# Patient Record
Sex: MALE | Race: WHITE
[De-identification: names, ages, dates, MRNs, and addresses within clinical notes are randomized per-mention and may not be internally consistent; named-entity substitution may affect disease eponyms.]

---

## 2017-04-13 ENCOUNTER — HOSPITAL ENCOUNTER (EMERGENCY)
Dept: HOSPITAL 43 - DL.ED | Age: 37
Discharge: HOME | End: 2017-04-13
Payer: MEDICARE

## 2017-04-13 VITALS — SYSTOLIC BLOOD PRESSURE: 144 MMHG | DIASTOLIC BLOOD PRESSURE: 79 MMHG
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
## 2017-04-13 DIAGNOSIS — Z86.73: ICD-10-CM

## 2017-04-13 DIAGNOSIS — S06.0X0A: Primary | ICD-10-CM

## 2017-04-13 LAB
CHLORIDE SERPL-SCNC: 105 MMOL/L (ref 101–111)
SODIUM SERPL-SCNC: 137 MMOL/L (ref 135–145)

## 2017-04-13 PROCEDURE — 70450 CT HEAD/BRAIN W/O DYE: CPT

## 2017-04-13 PROCEDURE — 81001 URINALYSIS AUTO W/SCOPE: CPT

## 2017-04-13 PROCEDURE — 80305 DRUG TEST PRSMV DIR OPT OBS: CPT

## 2017-04-13 PROCEDURE — G0480 DRUG TEST DEF 1-7 CLASSES: HCPCS

## 2017-04-13 PROCEDURE — 36415 COLL VENOUS BLD VENIPUNCTURE: CPT

## 2017-04-13 PROCEDURE — 99283 EMERGENCY DEPT VISIT LOW MDM: CPT

## 2017-04-13 PROCEDURE — 85025 COMPLETE CBC W/AUTO DIFF WBC: CPT

## 2017-04-13 PROCEDURE — 80053 COMPREHEN METABOLIC PANEL: CPT

## 2017-04-13 NOTE — EDM.PDOC
ED HPI HEAD INJURY





- General


Chief Complaint: Head Injury


Stated Complaint: HIT ON HEAD, [id number]


Time Seen by Provider: 04/13/17 16:00


Source of Information: Reports: Family (mother), RN, RN notes reviewed


History Limitations: Reports: Physical impairment (DE/disabled)





- History of Present Illness


INITIAL COMMENTS - FREE TEXT/NARRATIVE: 


Presented to the ER from home by mother by POV with complaint of head injury. 

Mother states it was unwitnessed. It was apparent he had struck his head on a 

metal bar attached to his desk. She does not believe there was loss of 

consciousness, but is unsure. Patient indicated to her that he had struck his 

head on the metal bar. He was having pain. Denies any other injury. Patient is 

unable to provide any history.


Symptom Onset Date: 04/13/17


Place of Occurrence: home


Improves with: none


Worsens with: none


Associated Symptoms: Reports: no other symptoms





- Related Data


Allergies/ADRs: 


 Allergies











Allergy/AdvReac Type Severity Reaction Status Date / Time


 


No Known Allergies Allergy   Verified 04/13/17 16:05











Home Meds: 


 Home Meds





. [No Known Home Meds]  09/06/14 [History]











Past Medical History


Neurological History: Reports: Cerebral palsy, CVA


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


Psychiatric History: Reports: ADD


Other Psychiatric History: provasive personality?





Social & Family History





- Family History


Family Medical History: Noncontributory





- Tobacco Use


Smoking Status *Q: Never Smoker


Second Hand Smoke Exposure: No





- Caffeine Use


Caffeine Use: Reports: None





- Recreational Drug Use


Recreational Drug Use: No





- Living Situation & Occupation


Living situation: Reports: with family


Occupation: disabled





ED ROS GENERAL





- Review of Systems


Review Of Systems: Unable To Obtain





ED EXAM, HEAD INJURY





- Physical Exam


Exam: See Below


Exam Limited By: Physical impairment (nonverbal/DD.)


General Appearance: alert, WD/WN, no apparent distress


Head: normocephalic, scalp tenderness


Eyes: bilateral eye: normal inspection


Ears: normal external exam, normal canal, hearing grossly normal, normal TMs


Nose: normal inspection, normal mucousa, no blood


Throat/Mouth: Normal inspection, Normal lips, Normal gums, Normal oropharynx, 

No airway compromise


Neck: non-tender, full range of motion, normal alignment, normal inspection


Respiratory: no respiratory distress, lungs clear, normal breath sounds, no 

accessory muscle use, chest non-tender


Cardiovascular: regular rate, rhythm


Back Exam: normal inspection


Extremities: no evidence of injury, normal range of motion, non-tender, no 

pedal edema, pelvis stable


Neurologic: no motor/sensory deficits (Uable to completely assess neuro exam 

due to mental delay, however, no obvious deficits.)





Course





- Vital Signs


Last Recorded V/S: 


 Last Vital Signs











Temp  36.8 C   04/13/17 15:54


 


Pulse  84   04/13/17 15:54


 


Resp  18   04/13/17 15:54


 


BP  144/79 H  04/13/17 15:54


 


Pulse Ox  97   04/13/17 15:54














- Orders/Labs/Meds


Labs: 


 Laboratory Tests











  04/13/17 04/13/17 04/13/17 Range/Units





  16:37 16:37 17:10 


 


WBC  7.4    (5.0-10.0)  10^3/uL


 


RBC  4.79    (4.6-6.2)  10^6/uL


 


Hgb  14.9    (14.0-18.0)  g/dL


 


Hct  43.4    (40.0-54.0)  %


 


MCV  90.6    ()  fL


 


MCH  31.1    (27.0-34.0)  pg


 


MCHC  34.3    (33.0-35.0)  g/dL


 


Plt Count  183    (150-450)  10^3/uL


 


Neut % (Auto)  71.0    (42.2-75.2)  %


 


Lymph % (Auto)  20.3 L    (20.5-50.1)  %


 


Mono % (Auto)  7.7    (2-8)  %


 


Eos % (Auto)  0.5 L    (1.0-3.0)  %


 


Baso % (Auto)  0.5    (0.0-1.0)  %


 


Sodium   137   (135-145)  mmol/L


 


Potassium   3.8   (3.6-5.0)  mmol/L


 


Chloride   105   (101-111)  mmol/L


 


Carbon Dioxide   25.0   (21.0-31.0)  mmol/L


 


Anion Gap   10.8   


 


BUN   16   (7-18)  mg/dL


 


Creatinine   0.8   (0.6-1.3)  mg/dL


 


Est Cr Clr Drug Dosing   106.89   mL/min


 


Estimated GFR (MDRD)   > 60   


 


BUN/Creatinine Ratio   20.00   


 


Glucose   77   ()  mg/dL


 


Calcium   8.7   (8.4-10.2)  mg/dl


 


Total Bilirubin   0.7   (0.2-1.0)  mg/dL


 


AST   25   (10-42)  IU/L


 


ALT   33   (10-60)  IU/L


 


Alkaline Phosphatase   65   ()  IU/L


 


Total Protein   7.3   (6.7-8.2)  g/dl


 


Albumin   4.4   (3.2-5.5)  g/dl


 


Globulin   2.9   


 


Albumin/Globulin Ratio   1.52   


 


Urine Color    Yellow  (YELLOW)  


 


Urine Appearance    Slightly cloudy  (CLEAR)  


 


Urine pH    5.5  (5.0-9.0)  


 


Ur Specific Gravity    1.020  (1.005-1.030)  


 


Urine Protein    Negative  (NEGATIVE)  


 


Urine Glucose (UA)    Negative  (NEGATIVE)  


 


Urine Ketones    Negative  (NEGATIVE)  


 


Urine Occult Blood    Negative  (NEGATIVE)  


 


Urine Nitrite    Negative  (NEGATIVE)  


 


Urine Bilirubin    Negative  (NEGATIVE)  


 


Urine Urobilinogen    0.2  (0.2-1.0)  mg/dL


 


Ur Leukocyte Esterase    Negative  (NEGATIVE)  


 


Urine RBC    0-5  /HPF


 


Urine WBC    Not seen  (0-5/HPF)  /HPF


 


Amorphous Sediment    Rare  (0/HPF)  /HPF


 


Urine Mucus    Few H  /LPF


 


Urine Opiates Screen     (NEGATIVE)  


 


Ur Oxycodone Screen     (NEGATIVE)  


 


Urine Methadone Screen     (NEGATIVE)  


 


Ur Barbiturates Screen     (NEGATIVE)  


 


U Tricyclic Antidepress     (NEGATIVE)  


 


Ur Phencyclidine Scrn     (NEGATIVE)  


 


Ur Amphetamine Screen     (NEGATIVE)  


 


U Methamphetamines Scrn     (NEGATIVE)  


 


Urine MDMA Screen     (NEGATIVE)  


 


U Benzodiazepines Scrn     (NEGATIVE)  


 


Urine Cocaine Screen     (NEGATIVE)  


 


U Marijuana (THC) Screen     (NEGATIVE)  


 


Ethyl Alcohol   < 5   mg/dL














  04/13/17 Range/Units





  17:10 


 


WBC   (5.0-10.0)  10^3/uL


 


RBC   (4.6-6.2)  10^6/uL


 


Hgb   (14.0-18.0)  g/dL


 


Hct   (40.0-54.0)  %


 


MCV   ()  fL


 


MCH   (27.0-34.0)  pg


 


MCHC   (33.0-35.0)  g/dL


 


Plt Count   (150-450)  10^3/uL


 


Neut % (Auto)   (42.2-75.2)  %


 


Lymph % (Auto)   (20.5-50.1)  %


 


Mono % (Auto)   (2-8)  %


 


Eos % (Auto)   (1.0-3.0)  %


 


Baso % (Auto)   (0.0-1.0)  %


 


Sodium   (135-145)  mmol/L


 


Potassium   (3.6-5.0)  mmol/L


 


Chloride   (101-111)  mmol/L


 


Carbon Dioxide   (21.0-31.0)  mmol/L


 


Anion Gap   


 


BUN   (7-18)  mg/dL


 


Creatinine   (0.6-1.3)  mg/dL


 


Est Cr Clr Drug Dosing   mL/min


 


Estimated GFR (MDRD)   


 


BUN/Creatinine Ratio   


 


Glucose   ()  mg/dL


 


Calcium   (8.4-10.2)  mg/dl


 


Total Bilirubin   (0.2-1.0)  mg/dL


 


AST   (10-42)  IU/L


 


ALT   (10-60)  IU/L


 


Alkaline Phosphatase   ()  IU/L


 


Total Protein   (6.7-8.2)  g/dl


 


Albumin   (3.2-5.5)  g/dl


 


Globulin   


 


Albumin/Globulin Ratio   


 


Urine Color   (YELLOW)  


 


Urine Appearance   (CLEAR)  


 


Urine pH   (5.0-9.0)  


 


Ur Specific Gravity   (1.005-1.030)  


 


Urine Protein   (NEGATIVE)  


 


Urine Glucose (UA)   (NEGATIVE)  


 


Urine Ketones   (NEGATIVE)  


 


Urine Occult Blood   (NEGATIVE)  


 


Urine Nitrite   (NEGATIVE)  


 


Urine Bilirubin   (NEGATIVE)  


 


Urine Urobilinogen   (0.2-1.0)  mg/dL


 


Ur Leukocyte Esterase   (NEGATIVE)  


 


Urine RBC   /HPF


 


Urine WBC   (0-5/HPF)  /HPF


 


Amorphous Sediment   (0/HPF)  /HPF


 


Urine Mucus   /LPF


 


Urine Opiates Screen  Negative  (NEGATIVE)  


 


Ur Oxycodone Screen  Negative  (NEGATIVE)  


 


Urine Methadone Screen  Negative  (NEGATIVE)  


 


Ur Barbiturates Screen  Negative  (NEGATIVE)  


 


U Tricyclic Antidepress  Negative  (NEGATIVE)  


 


Ur Phencyclidine Scrn  Negative  (NEGATIVE)  


 


Ur Amphetamine Screen  Negative  (NEGATIVE)  


 


U Methamphetamines Scrn  Negative  (NEGATIVE)  


 


Urine MDMA Screen  Negative  (NEGATIVE)  


 


U Benzodiazepines Scrn  Negative  (NEGATIVE)  


 


Urine Cocaine Screen  Negative  (NEGATIVE)  


 


U Marijuana (THC) Screen  Negative  (NEGATIVE)  


 


Ethyl Alcohol   mg/dL














- Radiology Interpretation


Free Text/Narrative:: 


Head CT: No acute intracranail findings per rad report. 





Departure





- Departure


Time of Disposition: 17:39


Disposition: Home, Self-Care 01


Condition: good


Clinical Impression: 


Concussion without loss of consciousness


Qualifiers:


 Encounter type: initial encounter Qualified Code(s): S06.0X0A - Concussion 

without loss of consciousness, initial encounter





Instructions:  Concussion, Adult, Easy-to-Read


Forms:  ED Department Discharge


Additional Instructions: 


Follow instructions on concussion discharge packet. 


Follow up in clinic in 5 to 7 days for recheck.

## 2017-04-13 NOTE — CT
Clinical history: 36-year-old mentally handicapped patient who reports "head injury" (unwitnessed).

 

Scan technique: Volume acquisition of data emergency unenhanced CT scan of the head obtained obtaine
d with the patient lying supine on the Siemens multi slice CT scanner [place], [place]. All data archived in the PACS system for storage and study (bone/brain windows).

 

Interpretation:

1. Uniformly thick bony calvarium without sign of skull fracture, underlying brain contusion or epid
ural/subdural hematoma.

2. Symmetric clear pneumatization of the mastoid and paranasal sinuses.

3. Magna cisterna magna variant posteriorly.

4. No supratentorial or posterior fossa mass lesion, hydrocephalus, ischemic infarct or signs of acu
te intracerebral/intraventricular/subarachnoid bleed.

 

CONCLUSION: No sign of skull fracture or closed head trauma.

## 2017-05-19 ENCOUNTER — HOSPITAL ENCOUNTER (EMERGENCY)
Dept: HOSPITAL 43 - DL.ED | Age: 37
Discharge: HOME | End: 2017-05-19
Payer: MEDICARE

## 2017-05-19 VITALS — SYSTOLIC BLOOD PRESSURE: 139 MMHG | DIASTOLIC BLOOD PRESSURE: 77 MMHG

## 2017-05-19 DIAGNOSIS — S43.402A: Primary | ICD-10-CM
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
## 2017-05-19 NOTE — EDM.PDOC
{null, 






ED HPI GENERAL MEDICAL PROBLEM





- General


Stated Complaint: LEFT SHOULDER


Time Seen by Provider: 05/19/17 07:14





- History of Present Illness


INITIAL COMMENTS - FREE TEXT/NARRATIVE: 





patient comes emergency Department today with his mother who is also his 

guardian and caregiver. History taking is somewhat limited as the patient is 

nonverbal with medical staff. According to the mother over the past 2 days he 

has complained of left shoulder pain. The mother denies any trauma to left 

shoulder. She believes that his left shoulder pain is from "wrenching" on many 

things that he does with a screwdriver. He has not taken anything for pain as 

she cannot get him to take anything for pain. He continues to move his arm 

without difficulty. She has tried heating pad with some improvement of the 

pain. He has never had anything like this in the past.





- Related Data


 Allergies











Allergy/AdvReac Type Severity Reaction Status Date / Time


 


No Known Allergies Allergy   Verified 04/13/17 16:05











Home Meds: 


 Home Meds





. [No Known Home Meds]  09/06/14 [History]











Past Medical History


Neurological History: Reports: Cerebral Palsy, CVA


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


Psychiatric History: Reports: ADD


Other Psychiatric History: provasive personality?





Social & Family History





- Family History


Family Medical History: Noncontributory





- Tobacco Use


Smoking Status *Q: Never Smoker


Second Hand Smoke Exposure: No





- Caffeine Use


Caffeine Use: Reports: None





- Recreational Drug Use


Recreational Drug Use: No





- Living Situation & Occupation


Living situation: Reports: with Family


Occupation: Disabled





Review of Systems





- Review of Systems


Review Of Systems: Unable To Obtain





Trauma Exam





- Physical Exam


Exam: See Below


Exam Limited By: Other (nonverbal)


General Appearance: Reports: Alert, WD/WN, No Apparent Distress


Neck: Reports: Non-Tender, Full Range of Motion, Normal Alignment, Normal 

Inspection


Respiratory Exam: Reports: No Respiratory Distress, Lungs Clear, Normal Breath 

Sounds, No Accessory Muscle Use, Chest Non-Tender


Cardiovascular: Reports: Normal Peripheral Pulses, Regular Rate, Rhythm


Extremities: No Evidence of Injury, Other (examination the shoulder is somewhat 

limited due to the patient's inability to follow directions and commands. There 

is no bruising swelling deformity erythema crepitus bony deformity of the left 

shoulder the left upper arm. There is some tenderness generalized over the 

shoulder although it is difficult to identify where it is specifically due to 

the patient's mental status. He does move the extremity to active range of 

motion spontaneously. CMS is intact to left upper extremity. There is no 

weakness but is able to be identified. Unremarkable exam of the left upper 

extremity. CMS is intact appropriate.)





Course





- Vital Signs


Last Recorded V/S: 


 Last Vital Signs











Temp  36.6 C   05/19/17 07:17


 


Pulse  69   05/19/17 07:17


 


Resp  16   05/19/17 07:17


 


BP  139/77   05/19/17 07:17


 


Pulse Ox  97   05/19/17 07:17














- Orders/Labs/Meds


Orders: 


 Active Orders 24 hr











 Category Date Time Status


 


 Shoulder Comp Lt [CR] Urgent Exams  05/19/17 07:20 Taken


 


 DME for Discharge [COMM] Stat Oth  05/19/17 07:53 Ordered











Meds: 


Medications














Discontinued Medications














Generic Name Dose Route Start Last Admin





  Trade Name Freq  PRN Reason Stop Dose Admin


 


Ketorolac Tromethamine  30 mg  05/19/17 07:21  05/19/17 07:34





  Toradol  IM  05/19/17 07:22  Not Given





  ONETIME ONE   














- Radiology Interpretation


Free Text/Narrative:: 





x-ray of the left shoulder per radiology multiple small sclerotic densities in 

the proximal humeral shaft, possibly bone islands. Without other findings to 

account for pain, suggest MRI.








Departure





- Departure


Time of Disposition: 07:55


Disposition: Home, Self-Care 01


Condition: good


Clinical Impression: 


Sprain of shoulder


Qualifiers:


 Encounter type: initial encounter Shoulder sprain type: unspecified sprain 

Laterality: left Qualified Code(s): S43.402A - Unspecified sprain of left 

shoulder joint, initial encounter








- Discharge Information


Instructions:  How to Use a Sling, Easy-to-Read, Shoulder Pain, Easy-to-Read


Additional Instructions: 


wear sling as much is possible over the next couple of days. Slowly increase 

range of motion.


Tylenol and/or ibuprofen as needed for pain.


Ice and/or heat to the left shoulder.


return to emergency Department if any new or worsening symptoms.


Recheck primary care provider or orthopedics in one week if not improving 

consider physical therapy her MRI at that time.





- My Orders


Last 24 Hours: 


My Active Orders





05/19/17 07:20


Shoulder Comp Lt [CR] Urgent 





05/19/17 07:53


DME for Discharge [COMM] Stat 














- Assessment/Plan


Last 24 Hours: 


My Active Orders





05/19/17 07:20


Shoulder Comp Lt [CR] Urgent 





05/19/17 07:53


DME for Discharge [COMM] Stat 











Assessment:: 





Left shoulder strain


Plan: 





wear sling as much is possible over the next couple of days. Slowly increase 

range of motion.


Tylenol and/or ibuprofen as needed for pain.


Ice and/or heat to the left shoulder.


return to emergency Department if any new or worsening symptoms.


Recheck primary care provider or orthopedics in one week if not improving 

consider physical therapy her MRI at that time.



}

## 2017-11-18 ENCOUNTER — HOSPITAL ENCOUNTER (EMERGENCY)
Dept: HOSPITAL 43 - DL.ED | Age: 37
Discharge: HOME | End: 2017-11-18
Payer: MEDICARE

## 2017-11-18 VITALS — DIASTOLIC BLOOD PRESSURE: 76 MMHG | SYSTOLIC BLOOD PRESSURE: 135 MMHG
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
## 2017-11-18 DIAGNOSIS — Z88.1: ICD-10-CM

## 2017-11-18 DIAGNOSIS — S61.210A: Primary | ICD-10-CM

## 2017-11-18 NOTE — EDM.PDOC
ED HPI GENERAL MEDICAL PROBLEM





- General


Chief Complaint: Laceration


Stated Complaint: CUT TO RIGHT INDEX FINGER


Time Seen by Provider: 11/18/17 09:30


Source of Information: Reports: Family


History Limitations: Reports: No Limitations





- History of Present Illness


INITIAL COMMENTS - FREE TEXT/NARRATIVE: 





This 36 yo male patient was brought to the ED due to a laceration to his right 

distal index finger. The patient's mother believes the patient was working with 

a screwdriver when he cut his finger. The patient would not answer questions 

regarding the incident. 


Onset: Today


Duration: Minutes:


Location: Reports: Upper Extremity, Right


Quality: Reports: Ache, Sharp


Severity: Mild


Improves with: Reports: None


Worsens with: Reports: None


Associated Symptoms: Reports: No Other Symptoms





- Related Data


 Allergies











Allergy/AdvReac Type Severity Reaction Status Date / Time


 


ampicillin Allergy  Stomach Verified 11/18/17 09:15





   Ache  











Home Meds: 


 Home Meds





. [No Known Home Meds]  09/06/14 [History]











Past Medical History


HEENT History: Reports: None


Cardiovascular History: Reports: None


Respiratory History: Reports: None


Gastrointestinal History: Reports: None


Genitourinary History: Reports: None


Musculoskeletal History: Reports: None


Neurological History: Reports: Cerebral Palsy, CVA


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


Psychiatric History: Reports: ADD, ADHD


Other Psychiatric History: pervasive personality


Endocrine/Metabolic History: Reports: None


Hematologic History: Reports: None


Immunologic History: Reports: None


Oncologic (Cancer) History: Reports: None


Dermatologic History: Reports: None





- Infectious Disease History


Infectious Disease History: Reports: None





- Past Surgical History


HEENT Surgical History: Reports: None


Cardiovascular Surgical History: Reports: None


Respiratory Surgical History: Reports: None


GI Surgical History: Reports: None


Male  Surgical History: Reports: None


Endocrine Surgical History: Reports: None


Musculoskeletal Surgical History: Reports: None





Social & Family History





- Family History


Family Medical History: Noncontributory





- Tobacco Use


Smoking Status *Q: Never Smoker


Second Hand Smoke Exposure: No





- Caffeine Use


Caffeine Use: Reports: Soda





- Recreational Drug Use


Recreational Drug Use: No





- Living Situation & Occupation


Living situation: Reports: with Family


Occupation: Disabled





ED ROS GENERAL





- Review of Systems


Review Of Systems: ROS reveals no pertinent complaints other than HPI.





ED EXAM, SKIN/RASH


Exam: See Below


Exam Limited By: No Limitations


General Appearance: Alert, WD/WN, Mild Distress


Eye Exam: Bilateral Eye: EOMI, Normal Inspection, PERRL


Ears: Normal External Exam, Normal Canal, Hearing Grossly Normal, Normal TMs


Nose: Normal Inspection, Normal Mucosa, No Blood


Throat/Mouth: Normal Inspection, Normal Lips, Normal Teeth, Normal Gums, Normal 

Oropharynx, Normal Voice, No Airway Compromise


Head: Atraumatic, Normocephalic


Neck: Normal Inspection, Supple, Non-Tender, Full Range of Motion


Respiratory/Chest: No Respiratory Distress, Lungs Clear, Normal Breath Sounds, 

No Accessory Muscle Use, Chest Non-Tender


Cardiovascular: Normal Peripheral Pulses, Regular Rate, Rhythm, No Edema, No 

Gallop, No JVD, No Murmur, No Rub


GI/Abdominal: Normal Bowel Sounds, Soft, Non-Tender, No Organomegaly, No 

Distention, No Abnormal Bruit, No Mass


 (Male) Exam: Deferred


Rectal (Males) Exam: Deferred


Back Exam: Normal Inspection, Full Range of Motion, NT


Extremities: Normal Range of Motion, No Pedal Edema, Normal Capillary Refill, 

Other (laceration to right distal index finger)


Neurological: Alert, Oriented, CN II-XII Intact, Normal Cognition, Normal Gait, 

Normal Reflexes, No Motor/Sensory Deficits


Psychiatric: Normal Affect, Normal Mood


Skin: Warm, Dry, Normal Color, No Rash


Characteristics: Linear


Associated features: Tenderness


Lymphatic: No Adenopathy





ED SKIN PROCEDURES





- Laceration/Wound Repair


  ** Right Distal Finger


Lac/Wound length In cm: 1.5


Appearance: Subcutaneous


Distal NVT: Neuro & Vascular Intact


Anesthetic Type: Local


Local Anesthesia - Lidocaine (Xylocaine): 1% Plain


Local Anesthetic Volume: 3cc


Skin Prep: Chlorhexidine (Hibiciens)


Exploration/Debridement/Repair: In a Bloodless Field, Explored to Base, 

Moderate Debridement, Multiple Flaps Aligned


Closed with: Sutures


Suture Size: 4-0


# of Sutures: 4


Suture Type: Prolene, Interrupted, Simple


Sterile Dressing Applied: Nurse


Tetanus Status Addressed: Yes


Complications: No





Course





- Vital Signs


Last Recorded V/S: 





 Last Vital Signs











Temp  37.2 C   11/18/17 09:18


 


Pulse  77   11/18/17 09:18


 


Resp  16   11/18/17 09:18


 


BP  135/76   11/18/17 09:18


 


Pulse Ox  97   11/18/17 09:18














- Orders/Labs/Meds


Meds: 





Medications














Discontinued Medications














Generic Name Dose Route Start Last Admin





  Trade Name Miroslava  PRN Reason Stop Dose Admin


 


Bacitracin  1 dose  11/18/17 09:28  11/18/17 09:32





  Bacitracin Oint 1 Gm  TOP  11/18/17 09:29  1 dose





  ONETIME ONE   Administration


 


Lidocaine HCl  30 ml  11/18/17 09:28  11/18/17 09:32





  Xylocaine-Mpf 1%  INJECT  11/18/17 09:29  30 ml





  ONETIME ONE   Administration














Departure





- Departure


Time of Disposition: 09:46


Disposition: Home, Self-Care 01


Condition: Fair


Clinical Impression: 


Laceration of right index finger


Qualifiers:


 Encounter type: initial encounter Damage to nail status: without damage 

Foreign body presence: without foreign body Qualified Code(s): S61.210A - 

Laceration without foreign body of right index finger without damage to nail, 

initial encounter








- Discharge Information


Instructions:  Laceration Care, Adult, Easy-to-Read


Care Plan Goals: 


The patient and mother were advised of the examination results during the 

visit. The laceration margins were well approximated during the visit. The 

patient should keep the area clean and dry over the next 48 hours. The patient 

should follow-up with his primary care facility in 10-14 days. If the patient 

has any additional symptoms or concerns, the patient should follow-up with his 

primary care facility or return to the emergency department.

## 2017-12-06 ENCOUNTER — HOSPITAL ENCOUNTER (EMERGENCY)
Dept: HOSPITAL 43 - DL.ED | Age: 37
Discharge: HOME | End: 2017-12-06
Payer: MEDICARE

## 2017-12-06 VITALS — SYSTOLIC BLOOD PRESSURE: 126 MMHG | DIASTOLIC BLOOD PRESSURE: 68 MMHG

## 2017-12-06 DIAGNOSIS — S61.210D: Primary | ICD-10-CM

## 2017-12-06 DIAGNOSIS — Z88.1: ICD-10-CM
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
## 2017-12-06 NOTE — EDM.PDOC
ED HPI GENERAL MEDICAL PROBLEM





- General


Chief Complaint: Wound Recheck


Stated Complaint: STITCHES COMING OUT, [id number]


Time Seen by Provider: 12/06/17 21:15


Source of Information: Reports: Patient


History Limitations: Reports: No Limitations





- History of Present Illness


INITIAL COMMENTS - FREE TEXT/NARRATIVE: 





This 38 yo male patient reports to the ED to have his sutures removed. The 

patient refused to go to the clinic to have the sutures removed, so was brought 

to the ED. 


Onset: Today


  ** Right 2-Index finger


Pain Score (Numeric/FACES): 2





- Related Data


 Allergies











Allergy/AdvReac Type Severity Reaction Status Date / Time


 


ampicillin Allergy  Stomach Verified 12/06/17 21:13





   Ache  











Home Meds: 


 Home Meds





. [No Known Home Meds]  09/06/14 [History]











Past Medical History


HEENT History: Reports: None


Cardiovascular History: Reports: None


Respiratory History: Reports: None


Gastrointestinal History: Reports: None


Genitourinary History: Reports: None


Musculoskeletal History: Reports: None


Neurological History: Reports: Cerebral Palsy, CVA


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


Psychiatric History: Reports: ADD, ADHD


Other Psychiatric History: pervasive personality


Endocrine/Metabolic History: Reports: None


Hematologic History: Reports: None


Immunologic History: Reports: None


Oncologic (Cancer) History: Reports: None


Dermatologic History: Reports: None





- Infectious Disease History


Infectious Disease History: Reports: None





- Past Surgical History


HEENT Surgical History: Reports: None


Cardiovascular Surgical History: Reports: None


Respiratory Surgical History: Reports: None


GI Surgical History: Reports: None


Male  Surgical History: Reports: None


Endocrine Surgical History: Reports: None


Musculoskeletal Surgical History: Reports: None





Social & Family History





- Family History


Family Medical History: Noncontributory





- Tobacco Use


Smoking Status *Q: Never Smoker


Second Hand Smoke Exposure: No





- Caffeine Use


Caffeine Use: Reports: Soda





- Recreational Drug Use


Recreational Drug Use: No





- Living Situation & Occupation


Living situation: Reports: with Family


Occupation: Disabled





ED ROS GENERAL





- Review of Systems


Review Of Systems: ROS reveals no pertinent complaints other than HPI.





ED EXAM, SKIN/RASH


Exam: See Below


Exam Limited By: No Limitations


General Appearance: Alert, WD/WN, No Apparent Distress


Eye Exam: Bilateral Eye: Normal Inspection


Ears: Normal External Exam


Nose: Normal Inspection


Throat/Mouth: Normal Inspection, Normal Lips, Normal Teeth, Normal Gums, Normal 

Oropharynx, Normal Voice, No Airway Compromise


Neck: Normal Inspection, Supple, Non-Tender, Full Range of Motion


Respiratory/Chest: No Respiratory Distress, Lungs Clear, Normal Breath Sounds, 

No Accessory Muscle Use, Chest Non-Tender


Cardiovascular: Normal Peripheral Pulses, Regular Rate, Rhythm


 (Male) Exam: Deferred


Rectal (Males) Exam: Deferred


Extremities: Other (wound appears to be well healed sutures were removed 

without incident.)





Course





- Vital Signs


Last Recorded V/S: 


 Last Vital Signs











Temp  36.8 C   12/06/17 21:08


 


Pulse  88   12/06/17 21:08


 


Resp  18   12/06/17 21:08


 


BP  140/86   12/06/17 21:08


 


Pulse Ox  98   12/06/17 21:08














Departure





- Departure


Time of Disposition: 21:23


Disposition: Home, Self-Care 01


Condition: Good


Clinical Impression: 


 Visit for suture removal








- Discharge Information


Instructions:  Suture Removal, Care After


Forms:  ED Department Discharge


Care Plan Goals: 


The patient was advised of the examination results during the visit. The 

sutures were removed without incident. If the patient has any additional 

symptoms or concerns, the patient should follow-up with his primary care 

facility or return to the emergency department.

## 2018-03-15 ENCOUNTER — HOSPITAL ENCOUNTER (EMERGENCY)
Dept: HOSPITAL 43 - DL.ED | Age: 38
Discharge: PSYCHIATRIC OR SUBSTANCE USE FACILITY | End: 2018-03-15
Payer: MEDICARE

## 2018-03-15 VITALS — SYSTOLIC BLOOD PRESSURE: 127 MMHG | DIASTOLIC BLOOD PRESSURE: 73 MMHG

## 2018-03-15 DIAGNOSIS — F99: Primary | ICD-10-CM

## 2018-03-15 DIAGNOSIS — Z88.1: ICD-10-CM

## 2018-03-15 LAB
APAP SERPL-SCNC: < 10 UMOL/L
CHLORIDE SERPL-SCNC: 102 MMOL/L (ref 101–111)
SODIUM SERPL-SCNC: 135 MMOL/L (ref 135–145)

## 2018-03-15 PROCEDURE — 80305 DRUG TEST PRSMV DIR OPT OBS: CPT

## 2018-03-15 PROCEDURE — 81001 URINALYSIS AUTO W/SCOPE: CPT

## 2018-03-15 PROCEDURE — 36415 COLL VENOUS BLD VENIPUNCTURE: CPT

## 2018-03-15 PROCEDURE — 82140 ASSAY OF AMMONIA: CPT

## 2018-03-15 PROCEDURE — 83735 ASSAY OF MAGNESIUM: CPT

## 2018-03-15 PROCEDURE — 99285 EMERGENCY DEPT VISIT HI MDM: CPT

## 2018-03-15 PROCEDURE — G0480 DRUG TEST DEF 1-7 CLASSES: HCPCS

## 2018-03-15 PROCEDURE — 82150 ASSAY OF AMYLASE: CPT

## 2018-03-15 PROCEDURE — 83690 ASSAY OF LIPASE: CPT

## 2018-03-15 PROCEDURE — 85025 COMPLETE CBC W/AUTO DIFF WBC: CPT

## 2018-03-15 PROCEDURE — 80053 COMPREHEN METABOLIC PANEL: CPT
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
